# Patient Record
(demographics unavailable — no encounter records)

---

## 2025-06-12 NOTE — ASSESSMENT
[FreeTextEntry1] : Patient presents with bothersome irritative voiding symptoms. Physical exam revealed a bladder in normal position. She is voiding adequately with mild post void residual. Her clinical picture is consistent with OAB. She will continue with oxybutynin ER 10mg and we will start her on Gemtesa 75mg daily; I did let her know that it can up to 6 weeks to experience medications effects. We also discussed bladder training techniques to decrease her daytime frequency. We did discuss that she may stop medications down the road. Her urine was collected for urinalysis, urine culture, and cytology. She will follow-up in 3 months to reevaluate. If her voiding symptoms have not improved the patient will be scheduled for formal evaluation with urodynamic studies and cystoscopy for a better assessment of her lower urinary tract function and anatomy.

## 2025-06-12 NOTE — HISTORY OF PRESENT ILLNESS
[FreeTextEntry1] : Patient is a 42 year old  female who presents with irritative voiding symptoms, present for many years. Patient is voiding with an adequate stream, with frequency, urgency, urge incontinence, nocturia x 2-4. She denies stress incontinence, gross hematuria, dysuria, fever or flank pain. She reports voiding every 30 min to 1 hour with adequate fluid intake. When she reduces fluid intake, she reports less frequency. She reports taking oxybutynin in with mild improvement in voiding symptoms. She had 3 vaginal deliveries, last one 8 years ago. She is not a smoker but reports exposure to secondhand smoke growing up.

## 2025-06-12 NOTE — ADDENDUM
[FreeTextEntry1] : Next Visit: PVR, U/A, U/C  Entered by Pauline Amin, acting as scribe and chaperone for Dr. Chris Ferraro.   The documentation recorded by the scribe accurately reflects the service I personally performed and the decisions made by me.

## 2025-06-12 NOTE — LETTER BODY
[Dear  ___] : Dear  [unfilled], [Consult Letter:] : I had the pleasure of evaluating your patient, [unfilled]. [Please see my note below.] : Please see my note below. [Consult Closing:] : Thank you very much for allowing me to participate in the care of this patient.  If you have any questions, please do not hesitate to contact me. [Sincerely,] : Sincerely, [FreeTextEntry3] : Chris